# Patient Record
Sex: FEMALE | Race: OTHER | HISPANIC OR LATINO
[De-identification: names, ages, dates, MRNs, and addresses within clinical notes are randomized per-mention and may not be internally consistent; named-entity substitution may affect disease eponyms.]

---

## 2023-05-19 ENCOUNTER — APPOINTMENT (OUTPATIENT)
Dept: PHYSICAL MEDICINE AND REHAB | Facility: CLINIC | Age: 48
End: 2023-05-19
Payer: COMMERCIAL

## 2023-05-19 ENCOUNTER — TRANSCRIPTION ENCOUNTER (OUTPATIENT)
Age: 48
End: 2023-05-19

## 2023-05-19 VITALS
SYSTOLIC BLOOD PRESSURE: 124 MMHG | DIASTOLIC BLOOD PRESSURE: 79 MMHG | HEIGHT: 63 IN | BODY MASS INDEX: 25.52 KG/M2 | WEIGHT: 144 LBS | HEART RATE: 70 BPM

## 2023-05-19 DIAGNOSIS — Z85.3 PERSONAL HISTORY OF MALIGNANT NEOPLASM OF BREAST: ICD-10-CM

## 2023-05-19 PROCEDURE — 99204 OFFICE O/P NEW MOD 45 MIN: CPT | Mod: 25

## 2023-05-19 PROCEDURE — 93702 BIS XTRACELL FLUID ANALYSIS: CPT

## 2023-05-19 NOTE — PHYSICAL EXAM
[FreeTextEntry1] : Gen: Patient is A&O x 3, NAD\par HEENT: EOMI, hearing grossly normal\par Resp: regular, non - labored\par CV: pulses regular\par Skin: no rashes, erythema\par Lymph: no clubbing, cyanosis, edema\par Inspection: no instability \par ROM: right shoulder abduction ~90 degrees \par Palpation: TTP right chest wall \par Sensation: intact to light touch\par Reflexes: 1+ and symmetric throughout\par Strength: 5/5 throughout\par Special tests: -Cortez sign, -Spurlgin sign \par Gait: normal, non-antalgic\par \par Bioimpedance spectroscopy performed today with L-Dex RUE -7.9 (post-operative baseline).

## 2023-05-19 NOTE — ASSESSMENT
[FreeTextEntry1] : 47 year old female presenting for evaluation.\par \par #Postmastectomy pain syndrome:\par -Worst pain located on right chest wall and pectoral muscle region.  No clinical signs for intrinsic shoulder pathology.  Decreased range of motion likely from postsurgical adhesions.\par -Case discussed with Bernadette Bose (breast surgery), cleared to start physical therapy\par -Start patient on breast cancer rehabilitation program with focus on range of motion, myofascial release and manual therapy.\par \par #Neuropathic pain\par -Start gabapentin 100 mg 3 times a day\par \par #At risk for lymphedema:\par -No clinical signs of lymphedema on exam\par -Lymphedema education provided to patient\par -Bioimpedance spectroscopy performed today with L-dex appropriate.\par -Next surveillance in August 2023.\par \par Follow up in 1 month.\par \par The patient had a baseline SOZO measurement, which I reviewed today. The score is -7.9 RUE. Bioimpedance spectroscopy helps identify the onset of lymphedema in an arm or leg before patients experience noticeable swelling. Research has shown that the early detection of lymphedema using L-Dex combined with treatment can reduce progression to chronic lymphedema by 95% in breast cancer patients. Whenever possible, patients are tested for baseline L-Dex score before cancer treatment begins and then are reassessed during regular follow-up visits using the SOZO device. Otherwise, this can be started postoperatively and continued during regular follow-up visits. If the patient’s L-Dex score increases above normal levels, that is a sign that lymphedema is developing, and a referral is made to physical therapy for further evaluation and/or early compression treatment. Lymphedema assessment with the SOZO L-Dex score is recommended to be done every 3 months for the first 3 years and then every 6 months for years 4 and 5, followed by annually afterwards.\par \par \par

## 2023-05-19 NOTE — HISTORY OF PRESENT ILLNESS
[FreeTextEntry1] : Ms. Hanks is a 47 year old female with history of right side breast cancer diagnosed on March 2023.  On April 18, 2023 she underwent a right mastectomy with SLNx (0/2 Nodes).  History of left breast implant from multiple years ago.  Denies having any chemo or radiation therapy.  Started on tamoxifen.  \par \par  used for visit:\par \par She reports that she feels pain and burning in her right chest wall region.  Reports difficulty with shoulder range of motion feeling tightness in her chest wall.  Denies any swelling or heaviness in her right upper extremity.  Denies any radiation pain from her neck.  Denies any new overt weakness.\par \par Plastic Surgeron:\par Dr. Ernandez \par [phone]\par \par Breast Surgeon:\par -Dr. Bergman

## 2023-06-23 ENCOUNTER — APPOINTMENT (OUTPATIENT)
Dept: PHYSICAL MEDICINE AND REHAB | Facility: CLINIC | Age: 48
End: 2023-06-23
Payer: COMMERCIAL

## 2023-06-23 VITALS
WEIGHT: 143 LBS | DIASTOLIC BLOOD PRESSURE: 71 MMHG | HEIGHT: 63 IN | SYSTOLIC BLOOD PRESSURE: 114 MMHG | HEART RATE: 70 BPM | RESPIRATION RATE: 14 BRPM | BODY MASS INDEX: 25.34 KG/M2

## 2023-06-23 PROCEDURE — 99214 OFFICE O/P EST MOD 30 MIN: CPT

## 2023-06-23 RX ORDER — GABAPENTIN 100 MG/1
100 CAPSULE ORAL 3 TIMES DAILY
Qty: 90 | Refills: 1 | Status: ACTIVE | COMMUNITY
Start: 2023-05-19 | End: 1900-01-01

## 2023-06-23 NOTE — PHYSICAL EXAM
[FreeTextEntry1] : Gen: Patient is A&O x 3, NAD\par HEENT: EOMI, hearing grossly normal\par Resp: regular, non - labored\par CV: pulses regular\par Skin: no rashes, erythema\par Lymph: no clubbing, cyanosis, edema\par Inspection: no instability \par ROM: right shoulder abduction ~140 degrees \par Palpation: TTP right chest wall \par Sensation: intact to light touch\par Reflexes: 1+ and symmetric throughout\par Strength: 5/5 throughout\par Special tests: -Cortez sign, -Spurlgin sign \par Gait: normal, non-antalgic\par \par

## 2023-06-23 NOTE — ASSESSMENT
[FreeTextEntry1] : 47 year old female presenting for evaluation.\par \par #Postmastectomy pain syndrome:\par -Worst pain located on right chest wall and pectoral muscle region.  No clinical signs for intrinsic shoulder pathology. \par -Continue PT in breast cancer rehabilitation program with focus on range of motion, myofascial release and manual therapy.\par \par #Neuropathic pain\par -Continue gabapentin 100 mg 3 times a day-rx sent \par \par #At risk for lymphedema:\par -No clinical signs of lymphedema on exam\par -Next surveillance in August 2023.\par \par Follow up in 6 weeks.  \par \par \par \par \par

## 2023-06-23 NOTE — HISTORY OF PRESENT ILLNESS
[FreeTextEntry1] : Ms. Hanks is a 47 year old female with history of right side breast cancer diagnosed on March 2023.  On April 18, 2023 she underwent a right mastectomy with SLNx (0/2 Nodes).  History of left breast implant from multiple years ago.  Denies having any chemo or radiation therapy.  Started on tamoxifen.  \par \par  used for visit:\par \par Since her last visit she started on gabapentin.  Denies any major side effects.  Reports it is helping with her chest wall pain and the burning pain and shooting pain.  She also is in physical therapy.  Reports her shoulder range of motion is improving and she is continue with home exercise programs.  Reports worst tightness is in her right chest wall with overhead activities.  Denies any overt shoulder pain.  Denies any swelling or heaviness in her right upper extremity.\par \par Plastic Surgeron:\par Dr. Ernandez \par [phone]\par \par Breast Surgeon:\par -Dr. Bergman

## 2023-06-28 ENCOUNTER — OFFICE (OUTPATIENT)
Dept: URBAN - METROPOLITAN AREA CLINIC 94 | Facility: CLINIC | Age: 48
Setting detail: OPHTHALMOLOGY
End: 2023-06-28
Payer: COMMERCIAL

## 2023-06-28 DIAGNOSIS — H16.223: ICD-10-CM

## 2023-06-28 DIAGNOSIS — H40.033: ICD-10-CM

## 2023-06-28 PROCEDURE — 92014 COMPRE OPH EXAM EST PT 1/>: CPT | Performed by: OPHTHALMOLOGY

## 2023-06-28 PROCEDURE — 92133 CPTRZD OPH DX IMG PST SGM ON: CPT | Performed by: OPHTHALMOLOGY

## 2023-06-28 ASSESSMENT — REFRACTION_CURRENTRX
OS_SPHERE: +1.50
OS_VPRISM_DIRECTION: PROGS
OD_VPRISM_DIRECTION: PROGS
OS_ADD: +1.25
OS_VPRISM_DIRECTION: PROGS
OS_OVR_VA: 20/
OS_AXIS: 000
OS_SPHERE: +0.50
OD_AXIS: 085
OS_ADD: +1.50
OD_SPHERE: +0.50
OS_VPRISM_DIRECTION: SV
OS_SPHERE: +2.00
OS_CYLINDER: 0.00
OD_CYLINDER: -0.75
OS_CYLINDER: -0.50
OD_CYLINDER: -0.50
OD_VPRISM_DIRECTION: SV
OD_ADD: +1.75
OS_CYLINDER: 0.00
OD_ADD: +1.50
OS_CYLINDER: -0.50
OS_AXIS: 079
OD_CYLINDER: -0.50
OS_SPHERE: +1.25
OD_AXIS: 102
OD_OVR_VA: 20/
OS_OVR_VA: 20/
OD_SPHERE: +1.00
OS_AXIS: 122
OD_SPHERE: +1.50
OS_VPRISM_DIRECTION: SV
OS_SPHERE: +0.50
OD_AXIS: 90
OS_AXIS: 000
OD_VPRISM_DIRECTION: PROGS
OD_VPRISM_DIRECTION: SV
OD_SPHERE: +2.00
OD_SPHERE: +0.75
OD_CYLINDER: -0.50
OD_CYLINDER: -0.50
OS_OVR_VA: 20/
OS_CYLINDER: -0.50
OD_AXIS: 092
OD_AXIS: 103
OS_ADD: +1.75
OD_OVR_VA: 20/
OS_AXIS: 127
OD_OVR_VA: 20/
OD_ADD: +1.25

## 2023-06-28 ASSESSMENT — REFRACTION_MANIFEST
OD_AXIS: 093
OS_SPHERE: +2.00
OD_VA1: 20/25
OU_VA: 20/20
OS_ADD: +1.75
OS_AXIS: 083
OD_ADD: +1.75
OD_CYLINDER: -0.50
OS_VA1: 20/20
OD_SPHERE: +1.50
OS_CYLINDER: -0.75

## 2023-06-28 ASSESSMENT — REFRACTION_AUTOREFRACTION
OD_AXIS: 093
OS_AXIS: 083
OD_CYLINDER: -1.00
OS_CYLINDER: -0.25
OD_SPHERE: +2.25
OS_SPHERE: +2.00

## 2023-06-28 ASSESSMENT — TONOMETRY
OD_IOP_MMHG: 10
OS_IOP_MMHG: 10

## 2023-06-28 ASSESSMENT — KERATOMETRY
OS_AXISANGLE_DEGREES: 132
METHOD_AUTO_MANUAL: AUTO
OS_K2POWER_DIOPTERS: 42.00
OD_AXISANGLE_DEGREES: 010
OD_K2POWER_DIOPTERS: 42.00
OD_K1POWER_DIOPTERS: 41.50
OS_K1POWER_DIOPTERS: 41.50

## 2023-06-28 ASSESSMENT — AXIALLENGTH_DERIVED
OD_AL: 23.7487
OS_AL: 23.6016
OD_AL: 23.553
OS_AL: 23.5045

## 2023-06-28 ASSESSMENT — PACHYMETRY
OS_CT_UM: 509
OD_CT_UM: 512
OS_CT_CORRECTION: 3
OD_CT_CORRECTION: 2

## 2023-06-28 ASSESSMENT — CONFRONTATIONAL VISUAL FIELD TEST (CVF)
OD_FINDINGS: FULL
OS_FINDINGS: FULL

## 2023-06-28 ASSESSMENT — SPHEQUIV_DERIVED
OD_SPHEQUIV: 1.25
OS_SPHEQUIV: 1.625
OD_SPHEQUIV: 1.75
OS_SPHEQUIV: 1.875

## 2023-06-28 ASSESSMENT — SUPERFICIAL PUNCTATE KERATITIS (SPK)
OD_SPK: 1+
OS_SPK: 1+

## 2023-06-28 ASSESSMENT — VISUAL ACUITY
OS_BCVA: 20/25
OD_BCVA: 20/30

## 2023-08-17 ENCOUNTER — NON-APPOINTMENT (OUTPATIENT)
Age: 48
End: 2023-08-17

## 2023-08-17 ENCOUNTER — APPOINTMENT (OUTPATIENT)
Dept: OBGYN | Facility: CLINIC | Age: 48
End: 2023-08-17
Payer: COMMERCIAL

## 2023-08-17 ENCOUNTER — LABORATORY RESULT (OUTPATIENT)
Age: 48
End: 2023-08-17

## 2023-08-17 VITALS
DIASTOLIC BLOOD PRESSURE: 70 MMHG | BODY MASS INDEX: 25.69 KG/M2 | WEIGHT: 145 LBS | SYSTOLIC BLOOD PRESSURE: 134 MMHG | HEIGHT: 63 IN

## 2023-08-17 DIAGNOSIS — Z00.00 ENCOUNTER FOR GENERAL ADULT MEDICAL EXAMINATION W/OUT ABNORMAL FINDINGS: ICD-10-CM

## 2023-08-17 PROCEDURE — 99386 PREV VISIT NEW AGE 40-64: CPT

## 2023-08-17 NOTE — HISTORY OF PRESENT ILLNESS
[FreeTextEntry1] : 46 yo p0 here for annual exam.  had hyst erectomy 2006 for fibroids, was having infertility treatmnents, had [facility] doctor tadvised her it was to prevent cancer .  had mastectomy in april, stage 1a, on tamoxifen, advised to see gyn by br surgeon.   to [facility] from [place] [facility]ays past 7 yrs.  has 4 stepsons.  reports some pelvic pain assos w straining to urinate occasionally, no other gyn co.

## 2023-08-17 NOTE — PHYSICAL EXAM
[Appropriately responsive] : appropriately responsive [Alert] : alert [No Acute Distress] : no acute distress [No Lymphadenopathy] : no lymphadenopathy [Regular Rate Rhythm] : regular rate rhythm [No Murmurs] : no murmurs [Clear to Auscultation B/L] : clear to auscultation bilaterally [Soft] : soft [Non-tender] : non-tender [Non-distended] : non-distended [No HSM] : No HSM [No Lesions] : no lesions [No Mass] : no mass [Oriented x3] : oriented x3 [Examination Of The Breasts] : a normal appearance [Right Breast Absent] : a total mastectomy [___] : a [unfilled] ~Ucm mastectomy scar [Breast Reconstruction Right] : breast reconstruction [Breast Reconstruction Left] : breast reconstruction [No Masses] : no breast masses were palpable [Labia Majora] : normal [Labia Minora] : normal [Normal] : normal [Absent] : absent [Uterine Adnexae] : normal

## 2023-08-21 LAB
HPV HIGH+LOW RISK DNA PNL CVX: DETECTED
HPV HIGH+LOW RISK DNA PNL CVX: DETECTED

## 2023-08-23 LAB — CYTOLOGY CVX/VAG DOC THIN PREP: NORMAL

## 2023-08-25 ENCOUNTER — APPOINTMENT (OUTPATIENT)
Dept: PHYSICAL MEDICINE AND REHAB | Facility: CLINIC | Age: 48
End: 2023-08-25
Payer: COMMERCIAL

## 2023-08-25 VITALS
DIASTOLIC BLOOD PRESSURE: 78 MMHG | WEIGHT: 145 LBS | HEIGHT: 63 IN | SYSTOLIC BLOOD PRESSURE: 120 MMHG | BODY MASS INDEX: 25.69 KG/M2 | HEART RATE: 80 BPM

## 2023-08-25 PROCEDURE — 93702 BIS XTRACELL FLUID ANALYSIS: CPT

## 2023-08-25 PROCEDURE — 99214 OFFICE O/P EST MOD 30 MIN: CPT | Mod: 25

## 2023-08-25 RX ORDER — GABAPENTIN 100 MG/1
100 CAPSULE ORAL 3 TIMES DAILY
Qty: 180 | Refills: 2 | Status: ACTIVE | COMMUNITY
Start: 2023-08-25 | End: 1900-01-01

## 2023-08-25 NOTE — HISTORY OF PRESENT ILLNESS
[FreeTextEntry1] : Ms. Hanks is a 47 year old female with history of right side breast cancer diagnosed on March 2023.  On April 18, 2023 she underwent a right mastectomy with SLNx (0/2 Nodes).  History of left breast implant from multiple years ago.  Denies having any chemo or radiation therapy.  Started on tamoxifen. States that she is scheduled for right breast implant on 9/7/2023.   used for visit:  She is doing therapy with Michaela and finds it helpful. She has improved function, improved range of motion and less pain. She has 3 more sessions of therapy left. Gabapentin is helpful and denies any side effects. She is on Tamoxifen and has hot flashes. Her hot flashes keeps her up at night. Denies any kidney issues.   Denies any heaviness or swelling in arms.  Plastic Surgeron: Dr. Ernandez  [phone]  Breast Surgeon: -Dr. Bergman

## 2023-08-25 NOTE — REVIEW OF SYSTEMS
[Negative] : Heme/Lymph [FreeTextEntry2] : Hot flashes [FreeTextEntry9] : +Chest wall/axilla pain almost resolved.

## 2023-08-25 NOTE — ASSESSMENT
[FreeTextEntry1] : 47-year-old female presenting for evaluation.  #Postmastectomy pain syndrome: -Improving -Continue PT in breast cancer rehabilitation program with focus on range of motion, myofascial release and manual therapy.  #Neuropathic pain -Increase gabapentin to 200 mg 3 times a day-rx sent   #Hot Flashes -Secondary to Tamoxifen -Increase gabapentin to 200 mg 3 times a day-rx sent   #At risk for lymphedema: -No clinical signs of lymphedema on exam -Bioimpedance spectroscopy performed today with L-dex appropriate  -Lymphedema education provided -Next surveillance in November 2023  Follow up in 4-6 weeks.    The patient had a follow-up SOZO measurement which I reviewed today. The score is -4.1. Bioimpedance spectroscopy helps identify the onset of lymphedema in an arm or leg before patients experience noticeable swelling. Research has shown that the early detection of lymphedema using L-Dex combined with treatment can reduce progression to chronic lymphedema by 95% in breast cancer patients. Whenever possible, patients are tested for baseline L-Dex score before cancer treatment begins and then are reassessed during regular follow-up visits using the SOZO device. Otherwise, this can be started postoperatively and continued during regular follow-up visits. If the patient's L-Dex score increases above normal levels, that is a sign that lymphedema is developing, and a referral is made to physical therapy for further evaluation and/or early compression treatment. Lymphedema assessment with the SOZO L-Dex score is recommended to be done every 3 months for the first 3 years and then every 6 months for years 4 and 5, followed by annually afterwards.

## 2023-08-25 NOTE — PHYSICAL EXAM
[FreeTextEntry1] : Gen: Patient is A&O x 3, NAD HEENT: EOMI, hearing grossly normal Resp: regular, non - labored CV: pulses regular Skin: no rashes, erythema Lymph: no clubbing, cyanosis, edema Inspection: no instability  ROM: Full range of motion of shoulders Palpation: Non-tender to palpation of right chest wall  Sensation: intact to light touch Reflexes: 1+ and symmetric throughout Strength: 5/5 throughout Special tests: +Cortez sign on right Gait: normal, non-antalgic  Bioimpedance spectroscopy performed today with L-Dex RUE -4.1 (-7.9 post-operative baseline)

## 2023-08-25 NOTE — END OF VISIT
[] : Fellow [FreeTextEntry3] : I have personally seen and examined the patient. I fully participated in the care of this patient. I have made amendments to the documentation where necessary, and agree with the history, physical exam, and plan as documented by the Fellow, Dr. Julio.

## 2023-09-08 ENCOUNTER — NON-APPOINTMENT (OUTPATIENT)
Age: 48
End: 2023-09-08

## 2023-09-13 ENCOUNTER — APPOINTMENT (OUTPATIENT)
Dept: ANTEPARTUM | Facility: CLINIC | Age: 48
End: 2023-09-13
Payer: COMMERCIAL

## 2023-09-13 ENCOUNTER — APPOINTMENT (OUTPATIENT)
Dept: OBGYN | Facility: CLINIC | Age: 48
End: 2023-09-13
Payer: COMMERCIAL

## 2023-09-13 ENCOUNTER — ASOB RESULT (OUTPATIENT)
Age: 48
End: 2023-09-13

## 2023-09-13 VITALS
BODY MASS INDEX: 25.69 KG/M2 | SYSTOLIC BLOOD PRESSURE: 134 MMHG | HEIGHT: 63 IN | DIASTOLIC BLOOD PRESSURE: 80 MMHG | WEIGHT: 145 LBS

## 2023-09-13 DIAGNOSIS — N83.209 UNSPECIFIED OVARIAN CYST, UNSPECIFIED SIDE: ICD-10-CM

## 2023-09-13 PROCEDURE — 76856 US EXAM PELVIC COMPLETE: CPT | Mod: 59

## 2023-09-13 PROCEDURE — 76830 TRANSVAGINAL US NON-OB: CPT

## 2023-09-13 PROCEDURE — 99214 OFFICE O/P EST MOD 30 MIN: CPT

## 2023-10-06 ENCOUNTER — APPOINTMENT (OUTPATIENT)
Dept: PHYSICAL MEDICINE AND REHAB | Facility: CLINIC | Age: 48
End: 2023-10-06
Payer: COMMERCIAL

## 2023-10-06 VITALS
BODY MASS INDEX: 26.05 KG/M2 | HEART RATE: 71 BPM | WEIGHT: 147 LBS | SYSTOLIC BLOOD PRESSURE: 119 MMHG | DIASTOLIC BLOOD PRESSURE: 76 MMHG | HEIGHT: 63 IN

## 2023-10-06 PROCEDURE — 99214 OFFICE O/P EST MOD 30 MIN: CPT

## 2023-10-23 ENCOUNTER — ASOB RESULT (OUTPATIENT)
Age: 48
End: 2023-10-23

## 2023-10-23 ENCOUNTER — APPOINTMENT (OUTPATIENT)
Dept: ANTEPARTUM | Facility: CLINIC | Age: 48
End: 2023-10-23
Payer: COMMERCIAL

## 2023-10-23 ENCOUNTER — APPOINTMENT (OUTPATIENT)
Dept: OBGYN | Facility: CLINIC | Age: 48
End: 2023-10-23
Payer: COMMERCIAL

## 2023-10-23 VITALS
BODY MASS INDEX: 26.4 KG/M2 | WEIGHT: 149 LBS | SYSTOLIC BLOOD PRESSURE: 126 MMHG | DIASTOLIC BLOOD PRESSURE: 77 MMHG | HEIGHT: 63 IN

## 2023-10-23 DIAGNOSIS — N83.201 UNSPECIFIED OVARIAN CYST, RIGHT SIDE: ICD-10-CM

## 2023-10-23 PROCEDURE — 99213 OFFICE O/P EST LOW 20 MIN: CPT

## 2023-10-23 PROCEDURE — 76857 US EXAM PELVIC LIMITED: CPT | Mod: 59

## 2023-10-23 PROCEDURE — 76830 TRANSVAGINAL US NON-OB: CPT

## 2023-10-27 ENCOUNTER — OFFICE (OUTPATIENT)
Dept: URBAN - METROPOLITAN AREA CLINIC 115 | Facility: CLINIC | Age: 48
Setting detail: OPHTHALMOLOGY
End: 2023-10-27
Payer: COMMERCIAL

## 2023-10-27 DIAGNOSIS — H40.033: ICD-10-CM

## 2023-10-27 DIAGNOSIS — H16.223: ICD-10-CM

## 2023-10-27 DIAGNOSIS — H52.4: ICD-10-CM

## 2023-10-27 PROCEDURE — 92014 COMPRE OPH EXAM EST PT 1/>: CPT | Performed by: OPHTHALMOLOGY

## 2023-10-27 PROCEDURE — 92250 FUNDUS PHOTOGRAPHY W/I&R: CPT | Performed by: OPHTHALMOLOGY

## 2023-10-27 PROCEDURE — 92015 DETERMINE REFRACTIVE STATE: CPT | Performed by: OPHTHALMOLOGY

## 2023-10-27 PROCEDURE — 92083 EXTENDED VISUAL FIELD XM: CPT | Performed by: OPHTHALMOLOGY

## 2023-10-27 ASSESSMENT — REFRACTION_CURRENTRX
OD_CYLINDER: -0.50
OD_OVR_VA: 20/
OS_VPRISM_DIRECTION: PROGS
OD_AXIS: 085
OS_OVR_VA: 20/
OD_AXIS: 90
OS_SPHERE: +0.50
OS_CYLINDER: -0.50
OS_AXIS: 000
OS_CYLINDER: 0.00
OD_CYLINDER: -0.50
OD_SPHERE: +0.75
OD_ADD: +1.50
OS_SPHERE: +2.00
OD_VPRISM_DIRECTION: SV
OD_VPRISM_DIRECTION: SV
OD_SPHERE: +0.50
OD_OVR_VA: 20/
OS_VPRISM_DIRECTION: SV
OD_VPRISM_DIRECTION: PROGS
OS_AXIS: 000
OS_VPRISM_DIRECTION: SV
OD_AXIS: 103
OD_AXIS: 092
OD_ADD: +1.75
OS_OVR_VA: 20/
OD_SPHERE: +2.00
OD_AXIS: 102
OD_SPHERE: +1.50
OS_VPRISM_DIRECTION: PROGS
OS_CYLINDER: -0.50
OS_ADD: +1.25
OD_VPRISM_DIRECTION: PROGS
OS_SPHERE: +1.25
OS_AXIS: 127
OS_OVR_VA: 20/
OD_CYLINDER: -0.75
OS_AXIS: 079
OS_AXIS: 122
OD_ADD: +1.25
OS_SPHERE: +0.50
OD_SPHERE: +1.00
OD_CYLINDER: -0.50
OS_ADD: +1.75
OS_SPHERE: +1.50
OS_ADD: +1.50
OS_CYLINDER: 0.00
OD_CYLINDER: -0.50
OS_CYLINDER: -0.50
OD_OVR_VA: 20/

## 2023-10-27 ASSESSMENT — SPHEQUIV_DERIVED
OD_SPHEQUIV: 1.375
OS_SPHEQUIV: 1.625
OD_SPHEQUIV: 1.375
OS_SPHEQUIV: 1.625
OD_SPHEQUIV: 1.25
OS_SPHEQUIV: 1.625

## 2023-10-27 ASSESSMENT — REFRACTION_MANIFEST
OS_AXIS: 083
OD_CYLINDER: -0.50
OD_AXIS: 093
OS_SPHERE: +1.75
OS_CYLINDER: -0.25
OS_ADD: +1.75
OS_VA1: 20/20
OD_ADD: +1.75
OU_VA: 20/20
OS_VA1: 20/25
OD_CYLINDER: -0.75
OS_SPHERE: +2.00
OD_AXIS: 064
OD_VA1: 20/25
OD_ADD: +1.75
OS_CYLINDER: -0.75
OD_VA1: 20/25
OD_SPHERE: +1.50
OS_ADD: +1.75
OU_VA: 20/20
OS_AXIS: 123
OD_SPHERE: +1.75

## 2023-10-27 ASSESSMENT — KERATOMETRY
OD_K1POWER_DIOPTERS: 41.50
OD_AXISANGLE_DEGREES: 010
OS_AXISANGLE_DEGREES: 132
OS_K2POWER_DIOPTERS: 42.00
OS_K1POWER_DIOPTERS: 41.50
METHOD_AUTO_MANUAL: AUTO
OD_K2POWER_DIOPTERS: 42.00

## 2023-10-27 ASSESSMENT — VISUAL ACUITY
OD_BCVA: 20/30
OS_BCVA: 20/25-1

## 2023-10-27 ASSESSMENT — REFRACTION_AUTOREFRACTION
OD_CYLINDER: -0.75
OS_CYLINDER: -0.25
OD_AXIS: 064
OD_SPHERE: +1.75
OS_SPHERE: +1.75
OS_AXIS: 125

## 2023-10-27 ASSESSMENT — CONFRONTATIONAL VISUAL FIELD TEST (CVF)
OS_FINDINGS: FULL
OD_FINDINGS: FULL

## 2023-10-27 ASSESSMENT — AXIALLENGTH_DERIVED
OD_AL: 23.7487
OD_AL: 23.6995
OD_AL: 23.6995
OS_AL: 23.6016

## 2023-10-27 ASSESSMENT — PACHYMETRY
OS_CT_UM: 509
OS_CT_CORRECTION: 3
OD_CT_CORRECTION: 2
OD_CT_UM: 512

## 2023-10-27 ASSESSMENT — SUPERFICIAL PUNCTATE KERATITIS (SPK)
OS_SPK: 1+
OD_SPK: 1+

## 2023-11-03 ENCOUNTER — APPOINTMENT (OUTPATIENT)
Dept: PHYSICAL MEDICINE AND REHAB | Facility: CLINIC | Age: 48
End: 2023-11-03
Payer: SELF-PAY

## 2023-11-03 VITALS
HEART RATE: 105 BPM | HEIGHT: 63 IN | BODY MASS INDEX: 25.69 KG/M2 | SYSTOLIC BLOOD PRESSURE: 149 MMHG | DIASTOLIC BLOOD PRESSURE: 75 MMHG | WEIGHT: 145 LBS

## 2023-11-03 DIAGNOSIS — Z91.89 OTHER SPECIFIED PERSONAL RISK FACTORS, NOT ELSEWHERE CLASSIFIED: ICD-10-CM

## 2023-11-03 DIAGNOSIS — M79.2 NEURALGIA AND NEURITIS, UNSPECIFIED: ICD-10-CM

## 2023-11-03 DIAGNOSIS — R23.2 FLUSHING: ICD-10-CM

## 2023-11-03 DIAGNOSIS — G89.28 OTHER CHRONIC POSTPROCEDURAL PAIN: ICD-10-CM

## 2023-11-03 PROCEDURE — 93702 BIS XTRACELL FLUID ANALYSIS: CPT

## 2023-11-03 PROCEDURE — 99214 OFFICE O/P EST MOD 30 MIN: CPT | Mod: 25

## 2023-11-03 RX ORDER — GABAPENTIN 300 MG/1
300 CAPSULE ORAL 3 TIMES DAILY
Qty: 270 | Refills: 1 | Status: ACTIVE | COMMUNITY
Start: 2023-10-06 | End: 1900-01-01

## 2023-11-20 ENCOUNTER — APPOINTMENT (OUTPATIENT)
Dept: ANTEPARTUM | Facility: CLINIC | Age: 48
End: 2023-11-20
Payer: SELF-PAY

## 2023-11-20 ENCOUNTER — ASOB RESULT (OUTPATIENT)
Age: 48
End: 2023-11-20

## 2023-11-20 ENCOUNTER — APPOINTMENT (OUTPATIENT)
Dept: OBGYN | Facility: CLINIC | Age: 48
End: 2023-11-20
Payer: SELF-PAY

## 2023-11-20 VITALS
HEIGHT: 63 IN | SYSTOLIC BLOOD PRESSURE: 123 MMHG | BODY MASS INDEX: 25.69 KG/M2 | WEIGHT: 145 LBS | DIASTOLIC BLOOD PRESSURE: 74 MMHG

## 2023-11-20 DIAGNOSIS — N83.202 UNSPECIFIED OVARIAN CYST, LEFT SIDE: ICD-10-CM

## 2023-11-20 DIAGNOSIS — B97.7 PAPILLOMAVIRUS AS THE CAUSE OF DISEASES CLASSIFIED ELSEWHERE: ICD-10-CM

## 2023-11-20 PROCEDURE — 99213 OFFICE O/P EST LOW 20 MIN: CPT

## 2023-11-20 PROCEDURE — 76856 US EXAM PELVIC COMPLETE: CPT | Mod: 59

## 2023-11-20 PROCEDURE — 76830 TRANSVAGINAL US NON-OB: CPT

## 2024-05-15 ENCOUNTER — OFFICE (OUTPATIENT)
Dept: URBAN - METROPOLITAN AREA CLINIC 115 | Facility: CLINIC | Age: 49
Setting detail: OPHTHALMOLOGY
End: 2024-05-15
Payer: COMMERCIAL

## 2024-05-15 DIAGNOSIS — H16.222: ICD-10-CM

## 2024-05-15 DIAGNOSIS — H40.033: ICD-10-CM

## 2024-05-15 DIAGNOSIS — H16.221: ICD-10-CM

## 2024-05-15 PROBLEM — H01.005 BLEPHARITIS; RIGHT LOWER LID, LEFT LOWER LID: Status: ACTIVE | Noted: 2024-05-15

## 2024-05-15 PROBLEM — H01.002 BLEPHARITIS; RIGHT LOWER LID, LEFT LOWER LID: Status: ACTIVE | Noted: 2024-05-15

## 2024-05-15 PROBLEM — H16.223 DRY EYE SYNDROME K SICCA; RIGHT EYE, LEFT EYE, BOTH EYES: Status: ACTIVE | Noted: 2024-05-15

## 2024-05-15 PROCEDURE — 83861 MICROFLUID ANALY TEARS: CPT | Mod: RT | Performed by: OPHTHALMOLOGY

## 2024-05-15 PROCEDURE — 83861 MICROFLUID ANALY TEARS: CPT | Mod: LT | Performed by: OPHTHALMOLOGY

## 2024-05-15 PROCEDURE — 92014 COMPRE OPH EXAM EST PT 1/>: CPT | Performed by: OPHTHALMOLOGY

## 2024-05-15 PROCEDURE — 92133 CPTRZD OPH DX IMG PST SGM ON: CPT | Performed by: OPHTHALMOLOGY

## 2024-05-15 ASSESSMENT — CONFRONTATIONAL VISUAL FIELD TEST (CVF)
OD_FINDINGS: FULL
OS_FINDINGS: FULL

## 2024-05-15 ASSESSMENT — LID EXAM ASSESSMENTS
OD_BLEPHARITIS: RLL 1+
OS_BLEPHARITIS: LLL 1+

## 2024-08-09 ENCOUNTER — APPOINTMENT (OUTPATIENT)
Dept: PHYSICAL MEDICINE AND REHAB | Facility: CLINIC | Age: 49
End: 2024-08-09

## 2024-08-09 PROCEDURE — 99214 OFFICE O/P EST MOD 30 MIN: CPT | Mod: 25

## 2024-08-09 PROCEDURE — 93702 BIS XTRACELL FLUID ANALYSIS: CPT

## 2024-08-09 NOTE — HISTORY OF PRESENT ILLNESS
[FreeTextEntry1] : Ms. Hanks is a 48 year old female with history of right side breast cancer diagnosed on March 2023.  On April 18, 2023 she underwent a right mastectomy with SLNx (0/2 Nodes).  History of left breast implant from multiple years ago.  Denies having any chemo or radiation therapy.  Started on tamoxifen. S/P Implant placement in September 2023.     used for visit:  She reports that she is feeling more pain in her right shoulder.  Worse with overhead activities.  No recent trauma.  Also reports numbness especially in her right hand in the first 3 digits.  Worse with bending her wrist when she is bicycling.  No radiation pain from her neck.  No weakness or bowel bladder dysfunction.  She was taking gabapentin which has been helpful.  No swelling or heaviness in her upper extremity.  Plastic Surgeron: Dr. Ernandez  [phone]  Breast Surgeon: -Dr. Bergman

## 2024-08-09 NOTE — PHYSICAL EXAM
[FreeTextEntry1] : Gen: Patient is A&O x 3, NAD HEENT: EOMI, hearing grossly normal Resp: regular, non - labored CV: pulses regular Skin: no rashes, erythema Lymph: no clubbing, cyanosis, edema Inspection: no instability  ROM: FROM right shoulder abduction, painful arc  Palpation: TTP right AC joint  Sensation: Decreased to LT in right digits 1-3 Reflexes: 1+ and symmetric throughout Special test: +Right chin sign, +Tinels at wrist, -Spurling sign, -Hoffmans sign  Strength: 5/5 throughout Gait: normal, non-antalgic  Bioimpedance spectroscopy performed today with L-Dex RUE -8.1 (-7.9 post-operative baseline).

## 2024-08-09 NOTE — ASSESSMENT
[FreeTextEntry1] : 48 year old female presenting for evaluation.   #Postmastectomy pain syndrome: -Secondary to postural changes -Discussed restarting PT, she would like to defer -Continue home exercise program   #Neuropathic pain -Continue gabapentin 300 mg 3 times a day-rx sent  #Right shoulder pain: -Likely secondary to subacromial impingement -Start meloxicam 15mg daily prn, advised on risks of prolonged NSAID use -Obtain right shoulder x-ray   #Right carpal tunnel syndrome: -Start neutral wrist splint   #Hot Flashes -Continue Gabapentin  #At risk for lymphedema: -No clinical signs of lymphedema on exam -Lymphedema education provided -Denies any electronic implants or pregnancy  -Bioimpedance spectroscopy performed with L-dex appropriate  -Next surveillance in November 2024  Follow up in 1 month.  The patient had a follow-up SOZO measurement which I reviewed today.  Bioimpedance spectroscopy helps identify the onset of lymphedema in an arm or leg before patients experience noticeable swelling. Research has shown that the early detection of lymphedema using L-Dex combined with treatment can reduce progression to chronic lymphedema by 95% in breast cancer patients. Whenever possible, patients are tested for baseline L-Dex score before cancer treatment begins and then are reassessed during regular follow-up visits using the SOZO device. Otherwise, this can be started postoperatively and continued during regular follow-up visits. If the patients L-Dex score increases above normal levels, that is a sign that lymphedema is developing, and a referral is made to physical therapy for further evaluation and/or early compression treatment. Lymphedema assessment with the SOZO L-Dex score is recommended to be done every 3 months for the first 3 years and then every 6 months for years 4 and 5, followed by annually afterwards.

## 2024-09-06 ENCOUNTER — APPOINTMENT (OUTPATIENT)
Dept: PHYSICAL MEDICINE AND REHAB | Facility: CLINIC | Age: 49
End: 2024-09-06
Payer: COMMERCIAL

## 2024-09-06 DIAGNOSIS — G56.01 CARPAL TUNNEL SYNDROME, RIGHT UPPER LIMB: ICD-10-CM

## 2024-09-06 DIAGNOSIS — M25.511 PAIN IN RIGHT SHOULDER: ICD-10-CM

## 2024-09-06 DIAGNOSIS — Z91.89 OTHER SPECIFIED PERSONAL RISK FACTORS, NOT ELSEWHERE CLASSIFIED: ICD-10-CM

## 2024-09-06 DIAGNOSIS — G89.28 OTHER CHRONIC POSTPROCEDURAL PAIN: ICD-10-CM

## 2024-09-06 DIAGNOSIS — M79.2 NEURALGIA AND NEURITIS, UNSPECIFIED: ICD-10-CM

## 2024-09-06 PROCEDURE — 99214 OFFICE O/P EST MOD 30 MIN: CPT

## 2024-09-06 RX ORDER — GABAPENTIN 400 MG/1
400 CAPSULE ORAL 3 TIMES DAILY
Qty: 90 | Refills: 3 | Status: ACTIVE | COMMUNITY
Start: 2024-09-06 | End: 1900-01-01

## 2024-09-06 NOTE — HISTORY OF PRESENT ILLNESS
[FreeTextEntry1] : Ms. Hanks is a 48 year old female with history of right side breast cancer diagnosed on March 2023.  On April 18, 2023 she underwent a right mastectomy with SLNx (0/2 Nodes).  History of left breast implant from multiple years ago.  Denies having any chemo or radiation therapy.  Started on tamoxifen. S/P Implant placement in September 2023.     used for visit:  Plastic Surgeron: Dr. Ernandez  [phone]  Breast Surgeon: -Dr. Bergman    She reports that she still is having right shoulder pain.  Worse with overhead activities.  No pain with rest.  Worse with sleeping on it at night.  No new weakness.  Reports that the numbness in her fingers comes and goes worse in digits 1 through 3.  No radiation pain from her neck no bowel bladder dysfunction.  Using brace which is helping.  Tried meloxicam helps when she takes it but still having pain in her right shoulder.  On gabapentin which is helping with hot flashes and her chest wall pain no side effects

## 2024-09-06 NOTE — ASSESSMENT
[FreeTextEntry1] : 48 year old female presenting for evaluation.   #Postmastectomy pain syndrome: -Start PT   #Neuropathic pain -Increase gabapentin to 400 mg 3 times a day-rx sent  #Right shoulder pain: -Likely secondary to subacromial impingement -meloxicam 15mg daily prn, advised on risks of prolonged NSAID use -Reviewed right shoulder x-ray -Start PT -If no improvement consider MRI   #Right carpal tunnel syndrome: -Continue neutral wrist splint  -Improving, no signs of atrophy or weakness   #Hot Flashes -Continue Gabapentin  #At risk for lymphedema: -No clinical signs of lymphedema on exam -Lymphedema education provided -Denies any electronic implants or pregnancy  -Next surveillance in November 2024  Follow up in 1 month.

## 2024-09-06 NOTE — DATA REVIEWED
[FreeTextEntry1] : Right shoulder x-ray August 2024: Unremarkable right shoulder exam. The patient is a 52y Female complaining of psychiatric evaluation.

## 2024-09-06 NOTE — PHYSICAL EXAM
[FreeTextEntry1] : Gen: Patient is A&O x 3, NAD HEENT: EOMI, hearing grossly normal Resp: regular, non - labored CV: pulses regular Skin: no rashes, erythema Lymph: no clubbing, cyanosis, edema Inspection: no instability  ROM: FROM right shoulder abduction, painful arc  Palpation: TTP right chest wall  Sensation: Decreased to LT in right digits 1-3 Reflexes: 1+ and symmetric throughout Special test: +Right chin sign, +Tinels at wrist, -Spurling sign, -Hoffmans sign, -Empty can test   Strength: 5/5 throughout Gait: normal, non-antalgic

## 2024-10-04 ENCOUNTER — APPOINTMENT (OUTPATIENT)
Dept: PHYSICAL MEDICINE AND REHAB | Facility: CLINIC | Age: 49
End: 2024-10-04
Payer: COMMERCIAL

## 2024-10-04 DIAGNOSIS — R23.2 FLUSHING: ICD-10-CM

## 2024-10-04 DIAGNOSIS — Z91.89 OTHER SPECIFIED PERSONAL RISK FACTORS, NOT ELSEWHERE CLASSIFIED: ICD-10-CM

## 2024-10-04 DIAGNOSIS — M79.2 NEURALGIA AND NEURITIS, UNSPECIFIED: ICD-10-CM

## 2024-10-04 DIAGNOSIS — G89.28 OTHER CHRONIC POSTPROCEDURAL PAIN: ICD-10-CM

## 2024-10-04 DIAGNOSIS — M25.511 PAIN IN RIGHT SHOULDER: ICD-10-CM

## 2024-10-04 PROCEDURE — 99214 OFFICE O/P EST MOD 30 MIN: CPT

## 2024-10-04 RX ORDER — GABAPENTIN 100 MG/1
100 CAPSULE ORAL
Qty: 150 | Refills: 1 | Status: ACTIVE | COMMUNITY
Start: 2024-10-04 | End: 1900-01-01

## 2024-10-04 NOTE — HISTORY OF PRESENT ILLNESS
[FreeTextEntry1] : Ms. Hanks is a 49 year old female with history of right side breast cancer diagnosed on March 2023.  On April 18, 2023 she underwent a right mastectomy with SLNx (0/2 Nodes).  History of left breast implant from multiple years ago.  Denies having any chemo or radiation therapy.  Started on tamoxifen. S/P Implant placement in September 2023.     used for visit:  Plastic Surgeron: Dr. Ernandez  [phone]  Breast Surgeon: -Dr. Bergman    She reports that she is still having pain especially in her right shoulder with overhead motion.  Has previously performed physical therapy and continues with home exercise program.  She takes meloxicam as needed.  Denies any focal weakness.  Also feels some pain in her chest wall still takes gabapentin before 100 mg makes her too tired during the day it does help with the pain though.  No new focal weakness.  Continues to follow-up with oncology team.

## 2024-10-04 NOTE — PHYSICAL EXAM
[FreeTextEntry1] : Gen: Patient is A&O x 3, NAD HEENT: EOMI, hearing grossly normal Resp: regular, non - labored CV: pulses regular Skin: no rashes, erythema Lymph: no clubbing, cyanosis, edema Inspection: no instability  ROM: FROM right shoulder abduction, painful arc  Palpation: TTP right chest wall  Sensation: Decreased to LT in right digits 1-3 Reflexes: 1+ and symmetric throughout Special test: +Right chin sign, -Tinels at wrist, -Spurling sign, -Hoffmans sign, -Empty can test   Strength: 5/5 throughout Gait: normal, non-antalgic

## 2024-10-04 NOTE — ASSESSMENT
[FreeTextEntry1] : 48 year old female presenting for evaluation.   #Postmastectomy pain syndrome: -Continue home exercise program -Gabapentin -Continue to follow up with oncology team   #Neuropathic pain -Adjust gabapentin to 100mg morning, afternoon and 300mg at night-rx sent  #Chronic Right shoulder pain: -Likely secondary to subacromial impingement -meloxicam 15mg daily prn, advised on risks of prolonged NSAID use -S/p X-ray, PT and NSAID use  -Recommend MRI right shoulder to further evaluate, patient reports she can have MRI   #Right carpal tunnel syndrome: -Continue neutral wrist splint  -Improving, no signs of atrophy or weakness  -Monitor   #Hot Flashes -Continue Gabapentin  #At risk for lymphedema: -No clinical signs of lymphedema on exam -Lymphedema education provided -Denies any electronic implants or pregnancy  -Next surveillance in November 2024  Follow up in 1 month.

## 2024-11-09 ENCOUNTER — OUTPATIENT (OUTPATIENT)
Dept: OUTPATIENT SERVICES | Facility: HOSPITAL | Age: 49
LOS: 1 days | End: 2024-11-09

## 2024-11-09 ENCOUNTER — APPOINTMENT (OUTPATIENT)
Dept: MRI IMAGING | Facility: CLINIC | Age: 49
End: 2024-11-09

## 2024-11-09 DIAGNOSIS — M25.511 PAIN IN RIGHT SHOULDER: ICD-10-CM

## 2024-11-09 PROCEDURE — 73221 MRI JOINT UPR EXTREM W/O DYE: CPT | Mod: 26,RT

## 2024-11-14 DIAGNOSIS — Z12.39 ENCOUNTER FOR OTHER SCREENING FOR MALIGNANT NEOPLASM OF BREAST: ICD-10-CM

## 2024-11-15 ENCOUNTER — APPOINTMENT (OUTPATIENT)
Dept: PHYSICAL MEDICINE AND REHAB | Facility: CLINIC | Age: 49
End: 2024-11-15
Payer: COMMERCIAL

## 2024-11-15 DIAGNOSIS — M25.511 PAIN IN RIGHT SHOULDER: ICD-10-CM

## 2024-11-15 DIAGNOSIS — Z91.89 OTHER SPECIFIED PERSONAL RISK FACTORS, NOT ELSEWHERE CLASSIFIED: ICD-10-CM

## 2024-11-15 DIAGNOSIS — G89.28 OTHER CHRONIC POSTPROCEDURAL PAIN: ICD-10-CM

## 2024-11-15 DIAGNOSIS — R23.2 FLUSHING: ICD-10-CM

## 2024-11-15 DIAGNOSIS — M79.2 NEURALGIA AND NEURITIS, UNSPECIFIED: ICD-10-CM

## 2024-11-15 PROCEDURE — 93702 BIS XTRACELL FLUID ANALYSIS: CPT

## 2024-11-15 PROCEDURE — 99214 OFFICE O/P EST MOD 30 MIN: CPT | Mod: 25

## 2024-11-18 ENCOUNTER — NON-APPOINTMENT (OUTPATIENT)
Age: 49
End: 2024-11-18

## 2024-11-21 PROBLEM — Z12.39 BREAST CANCER SCREENING: Status: ACTIVE | Noted: 2024-11-21

## 2024-12-19 ENCOUNTER — APPOINTMENT (OUTPATIENT)
Dept: PHYSICAL MEDICINE AND REHAB | Facility: CLINIC | Age: 49
End: 2024-12-19
Payer: COMMERCIAL

## 2024-12-19 VITALS
RESPIRATION RATE: 14 BRPM | HEIGHT: 63 IN | HEART RATE: 73 BPM | BODY MASS INDEX: 24.63 KG/M2 | DIASTOLIC BLOOD PRESSURE: 63 MMHG | WEIGHT: 139 LBS | SYSTOLIC BLOOD PRESSURE: 116 MMHG

## 2024-12-19 DIAGNOSIS — Z91.89 OTHER SPECIFIED PERSONAL RISK FACTORS, NOT ELSEWHERE CLASSIFIED: ICD-10-CM

## 2024-12-19 DIAGNOSIS — G89.28 OTHER CHRONIC POSTPROCEDURAL PAIN: ICD-10-CM

## 2024-12-19 DIAGNOSIS — M25.511 PAIN IN RIGHT SHOULDER: ICD-10-CM

## 2024-12-19 DIAGNOSIS — M79.2 NEURALGIA AND NEURITIS, UNSPECIFIED: ICD-10-CM

## 2024-12-19 PROCEDURE — 99214 OFFICE O/P EST MOD 30 MIN: CPT

## 2025-03-27 ENCOUNTER — APPOINTMENT (OUTPATIENT)
Dept: PHYSICAL MEDICINE AND REHAB | Facility: CLINIC | Age: 50
End: 2025-03-27

## 2025-05-15 ENCOUNTER — APPOINTMENT (OUTPATIENT)
Dept: PHYSICAL MEDICINE AND REHAB | Facility: CLINIC | Age: 50
End: 2025-05-15
Payer: COMMERCIAL

## 2025-05-15 VITALS
DIASTOLIC BLOOD PRESSURE: 70 MMHG | HEIGHT: 63 IN | HEART RATE: 89 BPM | SYSTOLIC BLOOD PRESSURE: 118 MMHG | WEIGHT: 138 LBS | RESPIRATION RATE: 14 BRPM | BODY MASS INDEX: 24.45 KG/M2

## 2025-05-15 DIAGNOSIS — Z91.89 OTHER SPECIFIED PERSONAL RISK FACTORS, NOT ELSEWHERE CLASSIFIED: ICD-10-CM

## 2025-05-15 DIAGNOSIS — M79.2 NEURALGIA AND NEURITIS, UNSPECIFIED: ICD-10-CM

## 2025-05-15 DIAGNOSIS — G89.28 OTHER CHRONIC POSTPROCEDURAL PAIN: ICD-10-CM

## 2025-05-15 DIAGNOSIS — M25.511 PAIN IN RIGHT SHOULDER: ICD-10-CM

## 2025-05-15 PROCEDURE — 93702 BIS XTRACELL FLUID ANALYSIS: CPT

## 2025-05-15 PROCEDURE — 99214 OFFICE O/P EST MOD 30 MIN: CPT | Mod: 25

## 2025-05-15 RX ORDER — PREGABALIN 25 MG/1
25 CAPSULE ORAL
Qty: 60 | Refills: 1 | Status: ACTIVE | COMMUNITY
Start: 2025-05-15 | End: 1900-01-01

## 2025-06-17 ENCOUNTER — NON-APPOINTMENT (OUTPATIENT)
Age: 50
End: 2025-06-17

## 2025-06-19 ENCOUNTER — APPOINTMENT (OUTPATIENT)
Dept: PHYSICAL MEDICINE AND REHAB | Facility: CLINIC | Age: 50
End: 2025-06-19
Payer: COMMERCIAL

## 2025-06-19 VITALS
DIASTOLIC BLOOD PRESSURE: 63 MMHG | WEIGHT: 140 LBS | RESPIRATION RATE: 14 BRPM | BODY MASS INDEX: 24.8 KG/M2 | SYSTOLIC BLOOD PRESSURE: 108 MMHG | HEIGHT: 63 IN | HEART RATE: 79 BPM

## 2025-06-19 PROCEDURE — 99214 OFFICE O/P EST MOD 30 MIN: CPT

## 2025-08-27 ENCOUNTER — OFFICE (OUTPATIENT)
Dept: URBAN - METROPOLITAN AREA CLINIC 94 | Facility: CLINIC | Age: 50
Setting detail: OPHTHALMOLOGY
End: 2025-08-27
Payer: COMMERCIAL

## 2025-08-27 DIAGNOSIS — H40.033: ICD-10-CM

## 2025-08-27 DIAGNOSIS — H16.223: ICD-10-CM

## 2025-08-27 DIAGNOSIS — H01.005: ICD-10-CM

## 2025-08-27 DIAGNOSIS — H01.002: ICD-10-CM

## 2025-08-27 PROBLEM — H25.13 CATARACT SENILE NUCLEAR SCLEROSIS; BOTH EYES: Status: ACTIVE | Noted: 2025-08-27

## 2025-08-27 PROCEDURE — 92250 FUNDUS PHOTOGRAPHY W/I&R: CPT | Performed by: OPHTHALMOLOGY

## 2025-08-27 PROCEDURE — 99214 OFFICE O/P EST MOD 30 MIN: CPT | Performed by: OPHTHALMOLOGY

## 2025-08-27 PROCEDURE — 92083 EXTENDED VISUAL FIELD XM: CPT | Performed by: OPHTHALMOLOGY

## 2025-08-27 ASSESSMENT — REFRACTION_CURRENTRX
OS_SPHERE: +0.50
OD_VPRISM_DIRECTION: PROGS
OD_SPHERE: +1.50
OS_AXIS: 122
OD_OVR_VA: 20/
OD_ADD: +1.25
OD_SPHERE: +0.50
OS_SPHERE: +0.50
OS_VPRISM_DIRECTION: PROGS
OD_OVR_VA: 20/
OD_ADD: +1.75
OS_SPHERE: +1.50
OS_CYLINDER: 0.00
OD_AXIS: 092
OD_VPRISM_DIRECTION: SV
OS_SPHERE: +2.00
OS_OVR_VA: 20/
OS_AXIS: 127
OD_VPRISM_DIRECTION: PROGS
OS_AXIS: 000
OS_OVR_VA: 20/
OD_CYLINDER: -0.50
OD_AXIS: 085
OD_SPHERE: +2.00
OS_VPRISM_DIRECTION: SV
OS_SPHERE: +1.25
OD_CYLINDER: -0.50
OS_ADD: +1.75
OD_CYLINDER: -0.75
OD_OVR_VA: 20/
OD_ADD: +1.50
OD_SPHERE: +1.00
OD_AXIS: 90
OD_CYLINDER: -0.50
OS_ADD: +1.25
OS_CYLINDER: -0.50
OD_AXIS: 103
OD_VPRISM_DIRECTION: SV
OS_OVR_VA: 20/
OS_ADD: +1.50
OD_SPHERE: +0.75
OS_AXIS: 079
OS_VPRISM_DIRECTION: PROGS
OS_VPRISM_DIRECTION: SV
OS_CYLINDER: 0.00
OD_CYLINDER: -0.50
OD_AXIS: 102
OS_CYLINDER: -0.50
OS_CYLINDER: -0.50
OS_AXIS: 000

## 2025-08-27 ASSESSMENT — REFRACTION_AUTOREFRACTION
OD_AXIS: 085
OS_CYLINDER: -0.50
OD_CYLINDER: -1.00
OS_SPHERE: +2.50
OD_SPHERE: +2.50
OS_AXIS: 087

## 2025-08-27 ASSESSMENT — TONOMETRY
OS_IOP_MMHG: 14
OD_IOP_MMHG: 14

## 2025-08-27 ASSESSMENT — REFRACTION_MANIFEST
OS_CYLINDER: -0.50
OD_CYLINDER: -0.75
OD_AXIS: 064
OD_AXIS: 085
OU_VA: 20/20
OD_CYLINDER: -0.50
OD_VA1: 20/25
OS_ADD: +1.75
OS_SPHERE: +2.50
OD_SPHERE: +1.50
OD_VA1: 20/25
OS_CYLINDER: -0.25
OD_VA1: 20/25
OS_CYLINDER: -0.75
OD_ADD: +1.75
OS_VA1: 20/20
OD_CYLINDER: -1.00
OD_ADD: +1.75
OS_ADD: +1.75
OS_VA1: 20/25
OS_AXIS: 123
OD_SPHERE: +1.75
OD_SPHERE: +2.50
OS_VA1: 20/25
OS_AXIS: 083
OU_VA: 20/20
OS_SPHERE: +1.75
OS_SPHERE: +2.00
OS_AXIS: 087
OD_AXIS: 093

## 2025-08-27 ASSESSMENT — LID EXAM ASSESSMENTS
OD_BLEPHARITIS: RLL 1+
OS_BLEPHARITIS: LLL 1+

## 2025-08-27 ASSESSMENT — KERATOMETRY
OS_K2POWER_DIOPTERS: 41.75
METHOD_AUTO_MANUAL: AUTO
OS_K1POWER_DIOPTERS: 41.75
OS_AXISANGLE_DEGREES: 090
OD_AXISANGLE_DEGREES: 015
OD_K2POWER_DIOPTERS: 42.25
OD_K1POWER_DIOPTERS: 42.00

## 2025-08-27 ASSESSMENT — PACHYMETRY
OD_CT_UM: 512
OS_CT_CORRECTION: 3
OD_CT_CORRECTION: 2
OS_CT_UM: 509

## 2025-08-27 ASSESSMENT — SUPERFICIAL PUNCTATE KERATITIS (SPK)
OS_SPK: 1+
OD_SPK: 1+

## 2025-08-27 ASSESSMENT — VISUAL ACUITY
OD_BCVA: 20/40-
OS_BCVA: 20/25

## 2025-08-27 ASSESSMENT — CONFRONTATIONAL VISUAL FIELD TEST (CVF)
OS_FINDINGS: FULL
OD_FINDINGS: FULL